# Patient Record
Sex: FEMALE | Race: BLACK OR AFRICAN AMERICAN | NOT HISPANIC OR LATINO | Employment: FULL TIME | URBAN - METROPOLITAN AREA
[De-identification: names, ages, dates, MRNs, and addresses within clinical notes are randomized per-mention and may not be internally consistent; named-entity substitution may affect disease eponyms.]

---

## 2023-02-25 LAB
ALANINE AMINOTRANSFERASE (SGPT) (U/L) IN SER/PLAS: 31 U/L (ref 7–45)
ALBUMIN (G/DL) IN SER/PLAS: 4.4 G/DL (ref 3.4–5)
ALKALINE PHOSPHATASE (U/L) IN SER/PLAS: 98 U/L (ref 33–110)
ANION GAP IN SER/PLAS: 11 MMOL/L (ref 10–20)
ASPARTATE AMINOTRANSFERASE (SGOT) (U/L) IN SER/PLAS: 31 U/L (ref 9–39)
BILIRUBIN TOTAL (MG/DL) IN SER/PLAS: 0.6 MG/DL (ref 0–1.2)
CALCIUM (MG/DL) IN SER/PLAS: 10 MG/DL (ref 8.6–10.6)
CARBON DIOXIDE, TOTAL (MMOL/L) IN SER/PLAS: 30 MMOL/L (ref 21–32)
CHLORIDE (MMOL/L) IN SER/PLAS: 103 MMOL/L (ref 98–107)
CHOLESTEROL (MG/DL) IN SER/PLAS: 241 MG/DL (ref 0–199)
CHOLESTEROL IN HDL (MG/DL) IN SER/PLAS: 87.9 MG/DL
CHOLESTEROL/HDL RATIO: 2.7
CREATININE (MG/DL) IN SER/PLAS: 1.02 MG/DL (ref 0.5–1.05)
GFR FEMALE: 67 ML/MIN/1.73M2
GLUCOSE (MG/DL) IN SER/PLAS: 90 MG/DL (ref 74–99)
LDL: 142 MG/DL (ref 0–99)
POTASSIUM (MMOL/L) IN SER/PLAS: 4.4 MMOL/L (ref 3.5–5.3)
PROTEIN TOTAL: 7.6 G/DL (ref 6.4–8.2)
SODIUM (MMOL/L) IN SER/PLAS: 140 MMOL/L (ref 136–145)
TRIGLYCERIDE (MG/DL) IN SER/PLAS: 58 MG/DL (ref 0–149)
UREA NITROGEN (MG/DL) IN SER/PLAS: 13 MG/DL (ref 6–23)
VLDL: 12 MG/DL (ref 0–40)

## 2023-09-23 ENCOUNTER — OFFICE VISIT (OUTPATIENT)
Dept: PRIMARY CARE | Facility: CLINIC | Age: 48
End: 2023-09-23
Payer: COMMERCIAL

## 2023-09-23 VITALS
DIASTOLIC BLOOD PRESSURE: 70 MMHG | WEIGHT: 293 LBS | HEIGHT: 71 IN | SYSTOLIC BLOOD PRESSURE: 132 MMHG | BODY MASS INDEX: 41.02 KG/M2

## 2023-09-23 DIAGNOSIS — E66.01 MORBID OBESITY (MULTI): ICD-10-CM

## 2023-09-23 DIAGNOSIS — M17.0 OSTEOARTHRITIS OF BOTH KNEES, UNSPECIFIED OSTEOARTHRITIS TYPE: Primary | ICD-10-CM

## 2023-09-23 DIAGNOSIS — E78.5 DYSLIPIDEMIA: ICD-10-CM

## 2023-09-23 PROCEDURE — 99214 OFFICE O/P EST MOD 30 MIN: CPT | Performed by: INTERNAL MEDICINE

## 2023-09-23 PROCEDURE — 1036F TOBACCO NON-USER: CPT | Performed by: INTERNAL MEDICINE

## 2023-09-23 RX ORDER — IBUPROFEN 800 MG/1
800 TABLET ORAL 3 TIMES DAILY PRN
Qty: 180 TABLET | Refills: 0 | Status: SHIPPED | OUTPATIENT
Start: 2023-09-23 | End: 2023-12-15

## 2023-09-23 RX ORDER — CYCLOBENZAPRINE HCL 10 MG
10 TABLET ORAL 3 TIMES DAILY
COMMUNITY
Start: 2023-04-12 | End: 2024-01-27 | Stop reason: ALTCHOICE

## 2023-09-23 RX ORDER — TIRZEPATIDE 2.5 MG/.5ML
2.5 INJECTION, SOLUTION SUBCUTANEOUS
Qty: 3 ML | Refills: 0 | Status: SHIPPED | OUTPATIENT
Start: 2023-09-23

## 2023-09-23 NOTE — PROGRESS NOTES
"Subjective   Patient ID: Taya Logan is a 48 y.o. female who presents for Follow-up and Med Refill.    Med Refill     Patient here for follow-up  due forblood work  Follow-up on high cholesterol  Lost few pounds  Having irregular menstrual cycle  Wants prescription for ibuprofen  Having difficult time losing weight  Had Achilles tendon and plantar fasciitis so difficult to do a lot of exercise        past recap  Patient here for follow-up on ultrasound of liver and ultrasound of parathyroid  Follow-up on elevated blood work for liver and parathyroid  She is changing the way she is eating and she has already lost 10 pounds in a month  She is thinking if she keeps losing this weight she does not want to go for gastric bypass  She is very motivated to make lifestyle changes  She wants refill on Motrin/omeprazole  Because of knee arthritis she is limited in her activity        Patient went for consultation for gastric bypass. Her blood work showed elevated PTH  She is going for sleep study on 10th  Had cardiac evaluation done normal     Patient is here for follow-up on blood work  She has started changing her attitude trying to eat better stay more active  She has made appointment with the bariatric surgeon because her orthopedic doctor said she cannot put more weight on her knee  She denies any risk for sleep apnea denies any sleepiness during the day sometimes she snores when she is tired  Sometimes feels dizzy     To establish PCP  For physical  Has bad arthritis in the knee needs Motrin prescription  Has struggled to lose weight     Past medical history: Gestational diabetes, arthroscopic surgery in both knees, pulmonary embolism after arthroscopic surgery on right knee     Social history: Non-smoker nondrinker     Review of Systems    Objective   /70   Ht 1.803 m (5' 11\")   Wt (!) 166 kg (367 lb)   BMI 51.19 kg/m²     Physical Exam  Vitals reviewed.   Constitutional:       Appearance: Normal appearance. "   HENT:      Head: Normocephalic and atraumatic.      Right Ear: Tympanic membrane, ear canal and external ear normal.      Left Ear: Tympanic membrane, ear canal and external ear normal.      Nose: Nose normal.      Mouth/Throat:      Pharynx: Oropharynx is clear.   Eyes:      Extraocular Movements: Extraocular movements intact.      Conjunctiva/sclera: Conjunctivae normal.      Pupils: Pupils are equal, round, and reactive to light.   Cardiovascular:      Rate and Rhythm: Normal rate and regular rhythm.      Pulses: Normal pulses.      Heart sounds: Normal heart sounds.   Pulmonary:      Effort: Pulmonary effort is normal.      Breath sounds: Normal breath sounds.   Abdominal:      General: Abdomen is flat. Bowel sounds are normal.      Palpations: Abdomen is soft.   Musculoskeletal:      Cervical back: Normal range of motion and neck supple.   Skin:     General: Skin is warm and dry.   Neurological:      General: No focal deficit present.      Mental Status: She is alert and oriented to person, place, and time.   Psychiatric:         Mood and Affect: Mood normal.         Assessment/Plan   Problem List Items Addressed This Visit             ICD-10-CM       Cardiac and Vasculature    Dyslipidemia E78.5       Endocrine/Metabolic    Morbid obesity (CMS/HCC) E66.01    Relevant Medications    tirzepatide (Mounjaro) 2.5 mg/0.5 mL pen injector       Musculoskeletal and Injuries    Osteoarthritis of both knees - Primary M17.0    Relevant Medications    ibuprofen 800 mg tablet     10/23  Cholesterol is reviewed  LDL has gone up slightly but HDL has improved quite a bit  Patient does not want to go on medication  Continue diet exercise  We will repeat blood work in 6 months  Her previous CT of the chest reviewed from 2013 which showed signs of pulmonary hypertension  We will get 2D echo  We will also do CT cardiac scoring for risk stratification  Again encouraged diet changes which will help her to lose weight     1/29  PTH  elevated  We will repeat PTH and ionized calcium and phosphorus  Ultrasound parathyroid  Patient does take calcium and vitamin D supplement  Liver enzyme is elevated as well which could be from fatty liver  We will check ultrasound liver and repeat blood work  Follow-up after the test     2/26  Ultrasound parathyroid normal  PTH level normal  Ultrasound liver normal  Liver blood test normal  Patient has lost 10 pounds  I think a lot of her abnormalities were related to her diet and lifestyle     5/7     Patient has lost more weight  She is still maintaining with diet and exercise  Blood work looks improved  Cholesterol is improving  We will recheck calcium and cholesterol in 6 months     2/25/23  Blood work ordered  Advised patient not to use too much ibuprofen it will hurt kidneys and cause for ulcer  Patient probably going through menopause  Ibuprofen prescribed  Again encouraged to lose weight or consider treatment options including surgery and medication     9/23/2023  Motrin prescription given  Blood work ordered  Cholesterol  Blood work ordered for cholesterol  Discussed diet and exercise  We can try Mounjaro for weight loss if covered by insurance

## 2023-09-29 PROBLEM — M17.0 OSTEOARTHRITIS OF BOTH KNEES: Status: ACTIVE | Noted: 2023-09-29

## 2023-09-29 PROBLEM — E66.01 MORBID OBESITY (MULTI): Status: ACTIVE | Noted: 2023-09-29

## 2023-09-29 PROBLEM — E78.5 DYSLIPIDEMIA: Status: ACTIVE | Noted: 2023-09-29

## 2023-10-07 PROBLEM — E66.9 OBESITY WITH BODY MASS INDEX 30 OR GREATER: Status: ACTIVE | Noted: 2023-10-07

## 2023-10-07 PROBLEM — L03.012 PARONYCHIA OF FINGER OF LEFT HAND: Status: ACTIVE | Noted: 2023-10-07

## 2023-10-07 PROBLEM — R79.89 ELEVATED PARATHYROID HORMONE: Status: ACTIVE | Noted: 2023-10-07

## 2023-10-07 PROBLEM — R20.2 PARESTHESIA: Status: ACTIVE | Noted: 2023-10-07

## 2023-10-07 PROBLEM — M17.0 PRIMARY OSTEOARTHRITIS OF BOTH KNEES: Status: ACTIVE | Noted: 2023-10-07

## 2023-10-07 PROBLEM — R79.89 ELEVATED LFTS: Status: ACTIVE | Noted: 2023-10-07

## 2023-10-07 PROBLEM — E66.01 SEVERE OBESITY (BMI >= 40) (MULTI): Status: ACTIVE | Noted: 2023-10-07

## 2023-10-07 PROBLEM — E78.5 HYPERLIPIDEMIA: Status: ACTIVE | Noted: 2023-10-07

## 2023-10-07 PROBLEM — I27.20 PULMONARY HYPERTENSION (MULTI): Status: ACTIVE | Noted: 2023-10-07

## 2023-10-07 PROBLEM — F06.8 OTHER SPECIFIED MENTAL DISORDERS DUE TO KNOWN PHYSIOLOGICAL CONDITION: Status: ACTIVE | Noted: 2023-10-07

## 2023-10-07 PROBLEM — Z98.84 BARIATRIC SURGERY STATUS: Status: ACTIVE | Noted: 2023-10-07

## 2023-10-07 PROBLEM — G47.30 SLEEP DISORDER BREATHING: Status: ACTIVE | Noted: 2023-10-07

## 2023-10-07 PROBLEM — R42 DIZZINESS: Status: ACTIVE | Noted: 2023-10-07

## 2023-10-07 RX ORDER — MELOXICAM 7.5 MG/1
7.5 TABLET ORAL 2 TIMES DAILY
COMMUNITY
Start: 2004-12-10 | End: 2024-01-27 | Stop reason: ALTCHOICE

## 2023-10-07 RX ORDER — CEPHALEXIN 500 MG/1
500 CAPSULE ORAL 3 TIMES DAILY
COMMUNITY
End: 2024-01-27 | Stop reason: ALTCHOICE

## 2023-10-09 ENCOUNTER — ANCILLARY PROCEDURE (OUTPATIENT)
Dept: RADIOLOGY | Facility: CLINIC | Age: 48
End: 2023-10-09
Payer: COMMERCIAL

## 2023-10-09 ENCOUNTER — OFFICE VISIT (OUTPATIENT)
Dept: ORTHOPEDIC SURGERY | Facility: CLINIC | Age: 48
End: 2023-10-09
Payer: COMMERCIAL

## 2023-10-09 ENCOUNTER — APPOINTMENT (OUTPATIENT)
Dept: ORTHOPEDIC SURGERY | Facility: CLINIC | Age: 48
End: 2023-10-09
Payer: COMMERCIAL

## 2023-10-09 DIAGNOSIS — M25.562 PAIN IN BOTH KNEES, UNSPECIFIED CHRONICITY: ICD-10-CM

## 2023-10-09 DIAGNOSIS — M17.0 PRIMARY OSTEOARTHRITIS OF BOTH KNEES: Primary | ICD-10-CM

## 2023-10-09 DIAGNOSIS — M77.8 TENDINITIS OF RIGHT SHOULDER: ICD-10-CM

## 2023-10-09 DIAGNOSIS — M25.511 ACUTE PAIN OF RIGHT SHOULDER: ICD-10-CM

## 2023-10-09 DIAGNOSIS — M25.561 PAIN IN BOTH KNEES, UNSPECIFIED CHRONICITY: ICD-10-CM

## 2023-10-09 PROCEDURE — 73030 X-RAY EXAM OF SHOULDER: CPT | Mod: RT,FY

## 2023-10-09 PROCEDURE — 73030 X-RAY EXAM OF SHOULDER: CPT | Mod: RIGHT SIDE | Performed by: STUDENT IN AN ORGANIZED HEALTH CARE EDUCATION/TRAINING PROGRAM

## 2023-10-09 PROCEDURE — 73564 X-RAY EXAM KNEE 4 OR MORE: CPT | Mod: 50

## 2023-10-09 PROCEDURE — 1036F TOBACCO NON-USER: CPT

## 2023-10-09 PROCEDURE — 73564 X-RAY EXAM KNEE 4 OR MORE: CPT | Mod: BILATERAL PROCEDURE | Performed by: STUDENT IN AN ORGANIZED HEALTH CARE EDUCATION/TRAINING PROGRAM

## 2023-10-09 PROCEDURE — 20610 DRAIN/INJ JOINT/BURSA W/O US: CPT

## 2023-10-09 PROCEDURE — 99214 OFFICE O/P EST MOD 30 MIN: CPT

## 2023-10-09 RX ORDER — LIDOCAINE HYDROCHLORIDE 10 MG/ML
4 INJECTION INFILTRATION; PERINEURAL
Status: COMPLETED | OUTPATIENT
Start: 2023-10-09 | End: 2023-10-09

## 2023-10-09 RX ORDER — MELOXICAM 15 MG/1
15 TABLET ORAL DAILY
Qty: 14 TABLET | Refills: 0 | Status: SHIPPED | OUTPATIENT
Start: 2023-10-09 | End: 2023-10-23

## 2023-10-09 RX ADMIN — LIDOCAINE HYDROCHLORIDE 4 ML: 10 INJECTION INFILTRATION; PERINEURAL at 10:53

## 2023-10-09 NOTE — PROGRESS NOTES
Taya Logan is a 48 y.o.  year-old  female presents with a chief complaint of Bilateral knee pain. Previously seen by Dr. Michael on 2/7/23 for her knees and has b/l steroid injections at that visit. She's these injections did help but the pain has progressively returned. Worse with activity. Better with injections and activity avoidance. They describe the symptoms as pain and swelling. Treatment to date has been ice/nsaids/activity modification without adequate relief. She is now having an acute exacerbation of underlying disease.     Patient is also having right shoulder pain. Ongoing for months now. Pain is in the front and outer aspect of the shoulder. Describes her pain has achy and sharp. Rates her pain at a 6/10. Admits to pain with rasing her arm over her head, pain with laying on her shoulder, night pain, and intermittent numbness and tingling down the arm into the last 2 fingers. Denies specific injury, weakness, neck pain, prior injury or surgeries to this shoulder. Has tried rest and home exercises with minimal relief of symptoms.       Past Medical, Family, and Social History reviewed and inlcude:[none] all other history pertinent to the presenting problem  Review of Systems  A complete review of systems was conducted, pertinent only to the HPI noted above.  Constitutional: None  Eyes: No additions to above history  Ears, Nose, Throat: No additions to above history  Cardiovascular: No additions to above history  Respiratory: No additions to above history  GI: No additions to above history  : No additions to above history  Skin/Neuro: No additions to above history  Endocrine/Heme/Lymph: No additions to above history  Immunologic: No additions to above history  Psychiatric: No additions to above history    Musculoskeletal: see above  GEN: Alert and Oriented x 3  Constitutional: Well appearing [male], in no apparent distress.  Eyes: sclera anicteric  ENT: hearing appropriate for normal conversation, neck  appears symmetric with no gross thyromegaly  Pulm: No labored breathing, no wheezing  CVS: Regular rate and rhythm  Skin: No rashes, erythema, or induration around knee    MUSCULOSKELETAL EXAM:     Bilateral KNEE:  ROM:  [0]/ [0] / 110  Effusion: [none]  Alignment: [neutral]      Sensation intact bilaterally sural/saphenous/sp/dp/tibial nerve bilaterally  Motor 5/5 knee flexion/extension/foot DF/PF/EHL/FHL bilaterally  Palpable/symmetric DP and PT pulse bilaterally      PATELLAR/EXTENSOR MECHANISM:  KNEE:  Patellar Crepitus: yes  Patellar Compression Pain:yes  Patellar Apprehension: [no]  Extensor Mechanism: [intact]  Straight Leg Raise: fair      LIGAMENTS:  ACL: Lachmans: [negative]  PCL: [stable]  Valgus at 0 degrees: [stable]  Valgus at 30 degrees: [stable]  Varus at 0 degrees: [stable]  Varus at 30 degrees: [stable]    MENISCUS EXAM:  Joint Line Tenderness:medial joint line tenderness  McMurrays: [negative]  Pain with Deep Flexion: [No]    Focused Musculoskeletal Exam:     Side: Right shoulder:  PROM:   FE (170)   ER 80 ABER/ABIR: 90/90  AROM:   FE (170)   ER 80 IR T8  Strength:  Supra [4+/5] Infra [5/5] Subscap [5/5]  Abd [5/5]    Special Tests  Shoulder  Impingement Tests:  Neer + Swift +  Speeds test +  Goldman's test +      ACJ:  AC TTP: [neg]  Cross Arm [neg]  AC prominence [no]      [Sensation intact Ax/median/ulnar/radial distributions  Motor intact Ax/median/radial/ulnar/AIN/PIN    X-rays of the shoulder ordered and independently viewed and interpreted:  Humeral head well aligned within glenoid. Joint spaces maintained. Small area of calcification noted in the distal supraspinatus tendon region. No fracture or dislocation.     Xrays of the b/l knees ordered and independently interpreted and reviewed:  Advanced DJD of the b/l knees, worse in right with significant osteophyte formation noted.     L Inj/Asp: bilateral knee on 10/9/2023 10:53 AM  Indications: pain  Details: 22 G needle, anterolateral  approach  Medications (Right): 40 mg triamcinolone acetonide 10 mg/mL; 4 mL lidocaine 10 mg/mL (1 %)  Medications (Left): 40 mg triamcinolone acetonide 10 mg/mL; 4 mL lidocaine 10 mg/mL (1 %)  Procedure, treatment alternatives, risks and benefits explained, specific risks discussed. Consent was given by the patient.          ASSESSMENT/PLAN  1. Primary osteoarthritis of both knees        2. Pain in both knees, unspecified chronicity  XR knee 4+ views bilateral      3. Acute pain of right shoulder  XR shoulder right 2+ views    Referral to Physical Therapy      4. Tendinitis of right shoulder           Knees:  The patient history, physical examination and imaging studies are consistent with knee arthritis. The treatment options including NSAIDs, activity modification, PT (including the importance of obtaining full motion), and weight loss were discussed at length today. I have also suggested a potential for b/l IA injection with cortisone and have provided today at her  request. I have discussed the potential need for arthroplasty in the future. The patient acknowledged the current plan.   Follow up will be 3 months if repeat injection indicated.    Right shoulder:  After a thorough discussion with the patient including expectations, I would recommend we continue a conservative program for now.  We discussed home/formal PT (deltoid isometrics, RTC strengthening, scapular stabilizers, stretching) and activity modification including avoidance of the positions and activities that provoke symptoms, including athletics.  We also discussed the ice and NSAIDS/tylenol. If they do not improve we'll get an MRI.      Social History     Socioeconomic History    Marital status:      Spouse name: Not on file    Number of children: Not on file    Years of education: Not on file    Highest education level: Not on file   Occupational History    Not on file   Tobacco Use    Smoking status: Never    Smokeless tobacco: Never    Substance and Sexual Activity    Alcohol use: Never    Drug use: Never    Sexual activity: Not on file   Other Topics Concern    Not on file   Social History Narrative    Not on file     Social Determinants of Health     Financial Resource Strain: Not on file   Food Insecurity: Not on file   Transportation Needs: Not on file   Physical Activity: Not on file   Stress: Not on file   Social Connections: Not on file   Intimate Partner Violence: Not on file   Housing Stability: Not on file      Active Ambulatory Problems     Diagnosis Date Noted    Dyslipidemia 09/29/2023    Osteoarthritis of both knees 09/29/2023    Morbid obesity (CMS/[facility]) 09/29/2023    Severe obesity (BMI >= 40) (CMS/[facility]) 10/07/2023    Bariatric surgery status 10/07/2023    Dizziness 10/07/2023    Elevated LFTs 10/07/2023    Elevated parathyroid hormone 10/07/2023    Hyperlipidemia 10/07/2023    Obesity with body mass index 30 or greater 10/07/2023    Other specified mental disorders due to known physiological condition 10/07/2023    Paresthesia 10/07/2023    Paronychia of finger of left hand 10/07/2023    Primary osteoarthritis of both knees 10/07/2023    Pulmonary hypertension (CMS/[facility]) 10/07/2023    Sleep disorder breathing 10/07/2023    Tendinitis of right shoulder 10/09/2023     Resolved Ambulatory Problems     Diagnosis Date Noted    No Resolved Ambulatory Problems     Past Medical History:   Diagnosis Date    Other pulmonary embolism without acute cor pulmonale (CMS/[facility]) 07/25/2017      Family History   Problem Relation Name Age of Onset    Hypertension Mother      Breast cancer Mother      Diabetes type II Mother      Hypertension Father      Lung cancer Father      Diabetes type II Father

## 2023-12-06 DIAGNOSIS — M17.0 OSTEOARTHRITIS OF BOTH KNEES, UNSPECIFIED OSTEOARTHRITIS TYPE: ICD-10-CM

## 2023-12-15 RX ORDER — IBUPROFEN 800 MG/1
800 TABLET ORAL 2 TIMES DAILY
Qty: 60 TABLET | Refills: 1 | Status: SHIPPED | OUTPATIENT
Start: 2023-12-15 | End: 2024-01-27 | Stop reason: SDUPTHER

## 2023-12-19 ENCOUNTER — ANCILLARY PROCEDURE (OUTPATIENT)
Dept: RADIOLOGY | Facility: CLINIC | Age: 48
End: 2023-12-19
Payer: COMMERCIAL

## 2023-12-19 ENCOUNTER — OFFICE VISIT (OUTPATIENT)
Dept: ORTHOPEDIC SURGERY | Facility: CLINIC | Age: 48
End: 2023-12-19
Payer: COMMERCIAL

## 2023-12-19 DIAGNOSIS — M25.552 LEFT HIP PAIN: ICD-10-CM

## 2023-12-19 DIAGNOSIS — M16.12 PRIMARY OSTEOARTHRITIS OF LEFT HIP: Primary | ICD-10-CM

## 2023-12-19 PROCEDURE — 1036F TOBACCO NON-USER: CPT | Performed by: STUDENT IN AN ORGANIZED HEALTH CARE EDUCATION/TRAINING PROGRAM

## 2023-12-19 PROCEDURE — 73502 X-RAY EXAM HIP UNI 2-3 VIEWS: CPT | Mod: LT

## 2023-12-19 PROCEDURE — 99213 OFFICE O/P EST LOW 20 MIN: CPT | Performed by: STUDENT IN AN ORGANIZED HEALTH CARE EDUCATION/TRAINING PROGRAM

## 2023-12-19 NOTE — PROGRESS NOTES
Taya Logan  is a 48 y.o. year-old  female. she presents to our office with left hip pain.  She has known knee osteoarthritis and had seen her for that in the past and provided corticosteroid injections.  Reports pain is deep in the groin as well as on the side and in the buttock.  There is been no injury.  She also discusses the shoulder pain that she has had since September she did see Elidia MESSINA earlier and prescribed a short course of anti-inflammatories that did not seem to help.  Is been no injury in the shoulder has pain reaching overhead but still has strength.     Past Medical, Family, and Social History reviewed and inlcude:[none] all other history pertinent to the presenting problem  Review of Systems  A complete review of systems was conducted, pertinent only to the HPI noted above.  Constitutional: None  Eyes: No additions to above history  Ears, Nose, Throat: No additions to above history  Cardiovascular: No additions to above history  Respiratory: No additions to above history  GI: No additions to above history  : No additions to above history  Skin/Neuro: No additions to above history  Endocrine/Heme/Lymph: No additions to above history  Immunologic: No additions to above history  Psychiatric: No additions to above history  Musculoskeletal: see above  Eyes: sclera anicteric  ENT: hearing appropriate for normal conversation, neck appears symmetric with no gross thyromegaly  Pulm: No labored breathing, no wheezing  CVS: Regular rate and rhythm  Abd: Non-distended  Skin: No rashes, erythema, or induration around hip    MUSCULOSKELETAL EXAM: HIP      Left  HIP:   IR@90: [20] degrees   ER@90: [70] degrees   Pain in groin with FADIR this reproduces her pain, + stinchfield, + subspine, no TTP over GT      Neurovascularly Intact to Femroal/obturator/LFCN/S/S/SPN/DPN/T nerves on bilateral extremities    Right shoulder was examined she has an active forward elevation of 160 degrees external rotation at the  side to 60 internal rotation to lower lumbar spine she has positive Neer and Swift cuff strength is 5 out of 5 but painful    Xrays independently reviewed and interpreted: There is some mild borderline dysplasia with a lateral center edge angle of 23 cam deformity mild degenerative changes  X-rays of the right shoulder were reviewed which demonstrate no degenerative changes and a small focus of calcium over her distal rotator cuff insertion    The patient history, physical examination and imaging studies are consistent with the diagnosis of femoroacetabular impingement (MARIANELA), borderline dysplasia and hip osteoarthritis.  Also reviewed her shoulder which likely has some rotator cuff tendinitis we will prescribe some home exercises that she can work on.  Can always consider cortisone shot in the future if required.    Options were discussed with the patient today, including continued conservative management vs. potential surgical intervention.  I reviewed given her degenerative changes and obesity she would not be a candidate for hip arthroscopy.  I suggested a conservative course including consideration for corticosteroid injection and I have referred her to a partner for that.  Did review overall the need to consider weight loss to improve symptoms and all of her joints, which she is actively working on but is very difficult with all the pain she is having in her joints.    After a thorough discussion with the patient including expectations, I would recommend we continue a conservative program for now.  We discussed activity modification, over the counter medications including NSAIDs, and PT.    I would like to proceed with a further work up/treatment/paperwork including the following:  intra-articular injection

## 2023-12-22 ENCOUNTER — APPOINTMENT (OUTPATIENT)
Dept: PRIMARY CARE | Facility: CLINIC | Age: 48
End: 2023-12-22
Payer: COMMERCIAL

## 2023-12-30 ENCOUNTER — APPOINTMENT (OUTPATIENT)
Dept: PRIMARY CARE | Facility: CLINIC | Age: 48
End: 2023-12-30
Payer: COMMERCIAL

## 2024-01-02 ENCOUNTER — OFFICE VISIT (OUTPATIENT)
Dept: ORTHOPEDIC SURGERY | Facility: CLINIC | Age: 49
End: 2024-01-02
Payer: COMMERCIAL

## 2024-01-02 DIAGNOSIS — M16.12 PRIMARY OSTEOARTHRITIS OF LEFT HIP: ICD-10-CM

## 2024-01-02 PROCEDURE — 1036F TOBACCO NON-USER: CPT | Performed by: FAMILY MEDICINE

## 2024-01-02 PROCEDURE — 99203 OFFICE O/P NEW LOW 30 MIN: CPT | Performed by: FAMILY MEDICINE

## 2024-01-02 PROCEDURE — 20611 DRAIN/INJ JOINT/BURSA W/US: CPT | Performed by: FAMILY MEDICINE

## 2024-01-02 RX ORDER — TRIAMCINOLONE ACETONIDE 40 MG/ML
80 INJECTION, SUSPENSION INTRA-ARTICULAR; INTRAMUSCULAR
Status: COMPLETED | OUTPATIENT
Start: 2024-01-02 | End: 2024-01-02

## 2024-01-02 RX ORDER — LIDOCAINE HYDROCHLORIDE 10 MG/ML
4 INJECTION INFILTRATION; PERINEURAL
Status: COMPLETED | OUTPATIENT
Start: 2024-01-02 | End: 2024-01-02

## 2024-01-02 RX ADMIN — LIDOCAINE HYDROCHLORIDE 4 ML: 10 INJECTION INFILTRATION; PERINEURAL at 16:23

## 2024-01-02 RX ADMIN — TRIAMCINOLONE ACETONIDE 80 MG: 40 INJECTION, SUSPENSION INTRA-ARTICULAR; INTRAMUSCULAR at 16:23

## 2024-01-02 NOTE — PROGRESS NOTES
** Please excuse any errors in grammar or translation related to this dictation. Voice recognition software was utilized to prepare this document. **    Assessment & Plan:  Patient referred for left hip steroid injection. After reviewing patient's medical history as well as examining patient, agree that a steroid injection may be beneficial for management of the patient's arthritic condition. Patient was given option to have this injection completed today which they agreed to have done. See below. Discussed with patient that this injection can be repeated every 3 or more months as dictated by symptoms.  Return precautions given. All questions answered and patient is agreeable this plan of care.    A copy of today's report will be electronically sent to  Dr. Michael.       Chief complaint:  Left hip pain    HPI:  49 y/o patient, hx of knee OA, presents with left hip pain.  This complaint has been ongoing for 8-9 months.  She was using a knee scooter after ankle surgery and had a few falls but is unsure if that is what caused her pain.  Symptoms have progressively worsened with time.  Pain is most prominent at buttock, lateral hip and groin.   Symptoms are aggravated by laying on side, working out, walking for extended periods. Previously saw Dr. Michael for this with last visit being 12/19/23. She was referred by Dr. Michael to be considered for US-guided hip joint injection.  Denies previous surgery to this site.     Exam:  LEFT Hip Exam:  No warmth, erythema or ecchymosis overlying.  Active flexion >90 degrees with grossly normal abduction, adduction  NTTP over greater trochanter  5/5 strength of hip flexion, abduction, & adduction  SILT  - Log roll pain, + FADIR pain,- BELKIS pain, + Stinchfield      Results:  X-rays of left hip obtained 12/19/2023 reviewed and independent interpreted as mild arthritic changes.  No fracture.  Normal alignment.  Cam deformity present.    Reviewed referral note from Dr. Michael dated  12/19/2023.    Procedure:  Patient ID: Taya Logan is a 48 y.o. female.    L Inj/Asp: L hip joint on 1/2/2024 4:23 PM  Indications: pain  Details: 22 G needle, ultrasound-guided anterior approach  Medications: 80 mg triamcinolone acetonide 40 mg/mL; 4 mL lidocaine 10 mg/mL (1 %)  Outcome: tolerated well, no immediate complications  Procedure, treatment alternatives, risks and benefits explained, specific risks discussed. Consent was given by the patient. Immediately prior to procedure a time out was called to verify the correct patient, procedure, equipment, support staff and site/side marked as required. Patient was prepped and draped in the usual sterile fashion.

## 2024-01-02 NOTE — LETTER
January 2, 2024     Rob Michael MD  [id number]  [place] [address]    Patient: Taya Logan   YOB: 1975   Date of Visit: 1/2/2024       Dear Dr. Rob Michael MD:    Thank you for referring Taya Logan to me for evaluation. Below are my notes for this consultation.  If you have questions, please do not hesitate to call me. I look forward to following your patient along with you.       Sincerely,     Dante Solorio, DO      CC: No Recipients  ______________________________________________________________________________________    ** Please excuse any errors in grammar or translation related to this dictation. Voice recognition software was utilized to prepare this document. **    Assessment & Plan:  Patient referred for left hip steroid injection. After reviewing patient's medical history as well as examining patient, agree that a steroid injection may be beneficial for management of the patient's arthritic condition. Patient was given option to have this injection completed today which they agreed to have done. See below. Discussed with patient that this injection can be repeated every 3 or more months as dictated by symptoms.  Return precautions given. All questions answered and patient is agreeable this plan of care.    A copy of today's report will be electronically sent to  Dr. Michael.       Chief complaint:  Left hip pain    HPI:  49 y/o patient, hx of knee OA, presents with left hip pain.  This complaint has been ongoing for 8-9 months.  She was using a knee scooter after ankle surgery and had a few falls but is unsure if that is what caused her pain.  Symptoms have progressively worsened with time.  Pain is most prominent at buttock, lateral hip and groin.   Symptoms are aggravated by laying on side, working out, walking for extended periods. Previously saw Dr. Michael for this with last visit being 12/19/23. She was referred by Dr. Michael to be considered for US-guided hip joint  injection.  Denies previous surgery to this site.     Exam:  LEFT Hip Exam:  No warmth, erythema or ecchymosis overlying.  Active flexion >90 degrees with grossly normal abduction, adduction  NTTP over greater trochanter  5/5 strength of hip flexion, abduction, & adduction  SILT  - Log roll pain, + FADIR pain,- BELKIS pain, + Stinchfield      Results:  X-rays of left hip obtained 12/19/2023 reviewed and independent interpreted as mild arthritic changes.  No fracture.  Normal alignment.  Cam deformity present.    Reviewed referral note from Dr. Michael dated 12/19/2023.    Procedure:  Patient ID: Taya Logan is a 48 y.o. female.    L Inj/Asp: L hip joint on 1/2/2024 4:23 PM  Indications: pain  Details: 22 G needle, ultrasound-guided anterior approach  Medications: 80 mg triamcinolone acetonide 40 mg/mL; 4 mL lidocaine 10 mg/mL (1 %)  Outcome: tolerated well, no immediate complications  Procedure, treatment alternatives, risks and benefits explained, specific risks discussed. Consent was given by the patient. Immediately prior to procedure a time out was called to verify the correct patient, procedure, equipment, support staff and site/side marked as required. Patient was prepped and draped in the usual sterile fashion.

## 2024-01-27 ENCOUNTER — LAB (OUTPATIENT)
Dept: LAB | Facility: LAB | Age: 49
End: 2024-01-27
Payer: COMMERCIAL

## 2024-01-27 ENCOUNTER — OFFICE VISIT (OUTPATIENT)
Dept: PRIMARY CARE | Facility: CLINIC | Age: 49
End: 2024-01-27
Payer: COMMERCIAL

## 2024-01-27 VITALS — WEIGHT: 293 LBS | BODY MASS INDEX: 41.02 KG/M2 | HEIGHT: 71 IN

## 2024-01-27 DIAGNOSIS — M17.0 OSTEOARTHRITIS OF BOTH KNEES, UNSPECIFIED OSTEOARTHRITIS TYPE: ICD-10-CM

## 2024-01-27 DIAGNOSIS — R73.9 ELEVATED BLOOD SUGAR: ICD-10-CM

## 2024-01-27 DIAGNOSIS — E78.5 DYSLIPIDEMIA: ICD-10-CM

## 2024-01-27 DIAGNOSIS — R79.89 ELEVATED LFTS: ICD-10-CM

## 2024-01-27 LAB
ALBUMIN SERPL BCP-MCNC: 4.5 G/DL (ref 3.4–5)
ALP SERPL-CCNC: 99 U/L (ref 33–110)
ALT SERPL W P-5'-P-CCNC: 27 U/L (ref 7–45)
ANION GAP SERPL CALC-SCNC: 12 MMOL/L (ref 10–20)
AST SERPL W P-5'-P-CCNC: 27 U/L (ref 9–39)
BILIRUB SERPL-MCNC: 0.4 MG/DL (ref 0–1.2)
BUN SERPL-MCNC: 12 MG/DL (ref 6–23)
CALCIUM SERPL-MCNC: 10.3 MG/DL (ref 8.6–10.6)
CHLORIDE SERPL-SCNC: 103 MMOL/L (ref 98–107)
CHOLEST SERPL-MCNC: 208 MG/DL (ref 0–199)
CHOLESTEROL/HDL RATIO: 2.5
CO2 SERPL-SCNC: 30 MMOL/L (ref 21–32)
CREAT SERPL-MCNC: 0.96 MG/DL (ref 0.5–1.05)
EGFRCR SERPLBLD CKD-EPI 2021: 72 ML/MIN/1.73M*2
ERYTHROCYTE [DISTWIDTH] IN BLOOD BY AUTOMATED COUNT: 14.2 % (ref 11.5–14.5)
EST. AVERAGE GLUCOSE BLD GHB EST-MCNC: 103 MG/DL
GLUCOSE SERPL-MCNC: 94 MG/DL (ref 74–99)
HBA1C MFR BLD: 5.2 %
HCT VFR BLD AUTO: 42.2 % (ref 36–46)
HDLC SERPL-MCNC: 83.8 MG/DL
HGB BLD-MCNC: 13.8 G/DL (ref 12–16)
LDLC SERPL CALC-MCNC: 111 MG/DL
MCH RBC QN AUTO: 28.5 PG (ref 26–34)
MCHC RBC AUTO-ENTMCNC: 32.7 G/DL (ref 32–36)
MCV RBC AUTO: 87 FL (ref 80–100)
NON HDL CHOLESTEROL: 124 MG/DL (ref 0–149)
NRBC BLD-RTO: 0 /100 WBCS (ref 0–0)
PLATELET # BLD AUTO: 337 X10*3/UL (ref 150–450)
POTASSIUM SERPL-SCNC: 4.4 MMOL/L (ref 3.5–5.3)
PROT SERPL-MCNC: 8.1 G/DL (ref 6.4–8.2)
RBC # BLD AUTO: 4.85 X10*6/UL (ref 4–5.2)
SODIUM SERPL-SCNC: 141 MMOL/L (ref 136–145)
TRIGL SERPL-MCNC: 65 MG/DL (ref 0–149)
TSH SERPL-ACNC: 1.58 MIU/L (ref 0.44–3.98)
VLDL: 13 MG/DL (ref 0–40)
WBC # BLD AUTO: 6.1 X10*3/UL (ref 4.4–11.3)

## 2024-01-27 PROCEDURE — 85027 COMPLETE CBC AUTOMATED: CPT

## 2024-01-27 PROCEDURE — 36415 COLL VENOUS BLD VENIPUNCTURE: CPT

## 2024-01-27 PROCEDURE — 84443 ASSAY THYROID STIM HORMONE: CPT

## 2024-01-27 PROCEDURE — 80053 COMPREHEN METABOLIC PANEL: CPT

## 2024-01-27 PROCEDURE — 1036F TOBACCO NON-USER: CPT | Performed by: INTERNAL MEDICINE

## 2024-01-27 PROCEDURE — 83036 HEMOGLOBIN GLYCOSYLATED A1C: CPT

## 2024-01-27 PROCEDURE — 99213 OFFICE O/P EST LOW 20 MIN: CPT | Performed by: INTERNAL MEDICINE

## 2024-01-27 PROCEDURE — 80061 LIPID PANEL: CPT

## 2024-01-27 RX ORDER — IBUPROFEN 800 MG/1
800 TABLET ORAL 2 TIMES DAILY
Qty: 60 TABLET | Refills: 1 | Status: SHIPPED | OUTPATIENT
Start: 2024-01-27

## 2024-01-29 NOTE — PROGRESS NOTES
"Subjective   Patient ID: Taya Logan is a 48 y.o. female who presents for Follow-up and Med Refill.    Med Refill     Patient here for follow-up  due forblood work  Follow-up on high cholesterol  Lost few pounds  Having irregular menstrual cycle  Wants prescription for ibuprofen  Working on losing weight has lost 10 pounds since January beginning        past recap  Patient here for follow-up on ultrasound of liver and ultrasound of parathyroid  Follow-up on elevated blood work for liver and parathyroid  She is changing the way she is eating and she has already lost 10 pounds in a month  She is thinking if she keeps losing this weight she does not want to go for gastric bypass  She is very motivated to make lifestyle changes  She wants refill on Motrin/omeprazole  Because of knee arthritis she is limited in her activity        Patient went for consultation for gastric bypass. Her blood work showed elevated PTH  She is going for sleep study on 10th  Had cardiac evaluation done normal     Patient is here for follow-up on blood work  She has started changing her attitude trying to eat better stay more active  She has made appointment with the bariatric surgeon because her orthopedic doctor said she cannot put more weight on her knee  She denies any risk for sleep apnea denies any sleepiness during the day sometimes she snores when she is tired  Sometimes feels dizzy     To establish PCP  For physical  Has bad arthritis in the knee needs Motrin prescription  Has struggled to lose weight     Past medical history: Gestational diabetes, arthroscopic surgery in both knees, pulmonary embolism after arthroscopic surgery on right knee     Social history: Non-smoker nondrinker     Review of Systems    Objective   Ht 1.803 m (5' 11\")   Wt (!) 163 kg (360 lb)   BMI 50.21 kg/m²     Physical Exam  Vitals reviewed.   Constitutional:       Appearance: Normal appearance.   HENT:      Head: Normocephalic and atraumatic.      Right " Ear: Tympanic membrane, ear canal and external ear normal.      Left Ear: Tympanic membrane, ear canal and external ear normal.      Nose: Nose normal.      Mouth/Throat:      Pharynx: Oropharynx is clear.   Eyes:      Extraocular Movements: Extraocular movements intact.      Conjunctiva/sclera: Conjunctivae normal.      Pupils: Pupils are equal, round, and reactive to light.   Cardiovascular:      Rate and Rhythm: Normal rate and regular rhythm.      Pulses: Normal pulses.      Heart sounds: Normal heart sounds.   Pulmonary:      Effort: Pulmonary effort is normal.      Breath sounds: Normal breath sounds.   Abdominal:      General: Abdomen is flat. Bowel sounds are normal.      Palpations: Abdomen is soft.   Musculoskeletal:      Cervical back: Normal range of motion and neck supple.   Skin:     General: Skin is warm and dry.   Neurological:      General: No focal deficit present.      Mental Status: She is alert and oriented to person, place, and time.   Psychiatric:         Mood and Affect: Mood normal.         Assessment/Plan   Problem List Items Addressed This Visit             ICD-10-CM       Cardiac and Vasculature    Dyslipidemia E78.5    Relevant Orders    CBC (Completed)    Comprehensive Metabolic Panel (Completed)    Lipid Panel (Completed)    Thyroid Stimulating Hormone (Completed)    Hemoglobin A1C (Completed)       Gastrointestinal and Abdominal    Elevated LFTs R79.89    Relevant Orders    CBC (Completed)    Comprehensive Metabolic Panel (Completed)    Lipid Panel (Completed)    Thyroid Stimulating Hormone (Completed)    Hemoglobin A1C (Completed)       Musculoskeletal and Injuries    Osteoarthritis of both knees M17.0    Relevant Medications    ibuprofen 800 mg tablet    Other Relevant Orders    Hemoglobin A1C (Completed)     Other Visit Diagnoses         Codes    Elevated blood sugar     R73.9    Relevant Orders    Hemoglobin A1C (Completed)          10/23  Cholesterol is reviewed  LDL has gone up  slightly but HDL has improved quite a bit  Patient does not want to go on medication  Continue diet exercise  We will repeat blood work in 6 months  Her previous CT of the chest reviewed from 2013 which showed signs of pulmonary hypertension  We will get 2D echo  We will also do CT cardiac scoring for risk stratification  Again encouraged diet changes which will help her to lose weight     1/29  PTH elevated  We will repeat PTH and ionized calcium and phosphorus  Ultrasound parathyroid  Patient does take calcium and vitamin D supplement  Liver enzyme is elevated as well which could be from fatty liver  We will check ultrasound liver and repeat blood work  Follow-up after the test     2/26  Ultrasound parathyroid normal  PTH level normal  Ultrasound liver normal  Liver blood test normal  Patient has lost 10 pounds  I think a lot of her abnormalities were related to her diet and lifestyle     5/7     Patient has lost more weight  She is still maintaining with diet and exercise  Blood work looks improved  Cholesterol is improving  We will recheck calcium and cholesterol in 6 months     2/25/23  Blood work ordered  Advised patient not to use too much ibuprofen it will hurt kidneys and cause for ulcer  Patient probably going through menopause  Ibuprofen prescribed  Again encouraged to lose weight or consider treatment options including surgery and medication     9/23/2023  Motrin prescription given  Blood work ordered  Cholesterol  Blood work ordered for cholesterol  Discussed diet and exercise  We can try Mounjaro for weight loss if covered by insurance    1/26/2024  Blood work reviewed  Everything is looking good except cholesterol is little high  Patient is eating a lot of meat  She is cannot take of her diet  Blood pressure is stable  Refills given on Motrin  Appreciated for losing weight  Continue diet and exercise  Patient is refusing weight loss shots  Follow-up in 6 months

## 2024-02-19 ENCOUNTER — APPOINTMENT (OUTPATIENT)
Dept: RADIOLOGY | Facility: CLINIC | Age: 49
End: 2024-02-19
Payer: COMMERCIAL

## 2024-03-08 ENCOUNTER — HOSPITAL ENCOUNTER (OUTPATIENT)
Dept: RADIOLOGY | Facility: CLINIC | Age: 49
Discharge: HOME | End: 2024-03-08
Payer: COMMERCIAL

## 2024-03-08 VITALS — WEIGHT: 293 LBS | HEIGHT: 72 IN | BODY MASS INDEX: 39.68 KG/M2

## 2024-03-08 DIAGNOSIS — Z12.31 ENCOUNTER FOR SCREENING MAMMOGRAM FOR MALIGNANT NEOPLASM OF BREAST: ICD-10-CM

## 2024-03-08 PROCEDURE — 77063 BREAST TOMOSYNTHESIS BI: CPT | Performed by: RADIOLOGY

## 2024-03-08 PROCEDURE — 77067 SCR MAMMO BI INCL CAD: CPT | Performed by: RADIOLOGY

## 2024-03-08 PROCEDURE — 77067 SCR MAMMO BI INCL CAD: CPT

## 2024-03-25 ENCOUNTER — APPOINTMENT (OUTPATIENT)
Dept: OBSTETRICS AND GYNECOLOGY | Facility: CLINIC | Age: 49
End: 2024-03-25
Payer: COMMERCIAL

## 2024-04-16 ENCOUNTER — LAB (OUTPATIENT)
Dept: LAB | Facility: LAB | Age: 49
End: 2024-04-16
Payer: COMMERCIAL

## 2024-04-16 DIAGNOSIS — H15.101 UNSPECIFIED EPISCLERITIS, RIGHT EYE: Primary | ICD-10-CM

## 2024-04-16 PROCEDURE — 82164 ANGIOTENSIN I ENZYME TEST: CPT

## 2024-04-16 PROCEDURE — 83516 IMMUNOASSAY NONANTIBODY: CPT

## 2024-04-16 PROCEDURE — 87389 HIV-1 AG W/HIV-1&-2 AB AG IA: CPT

## 2024-04-16 PROCEDURE — 36415 COLL VENOUS BLD VENIPUNCTURE: CPT

## 2024-04-16 PROCEDURE — 86036 ANCA SCREEN EACH ANTIBODY: CPT

## 2024-04-16 PROCEDURE — 85025 COMPLETE CBC W/AUTO DIFF WBC: CPT

## 2024-04-16 PROCEDURE — 86038 ANTINUCLEAR ANTIBODIES: CPT

## 2024-04-16 PROCEDURE — 86481 TB AG RESPONSE T-CELL SUSP: CPT

## 2024-04-16 PROCEDURE — 80053 COMPREHEN METABOLIC PANEL: CPT

## 2024-04-16 PROCEDURE — 85549 MURAMIDASE: CPT

## 2024-04-16 PROCEDURE — 86780 TREPONEMA PALLIDUM: CPT

## 2024-04-17 LAB
ALBUMIN SERPL BCP-MCNC: 4.1 G/DL (ref 3.4–5)
ALP SERPL-CCNC: 67 U/L (ref 33–110)
ALT SERPL W P-5'-P-CCNC: 11 U/L (ref 7–45)
ANA SER QL HEP2 SUBST: NEGATIVE
ANION GAP SERPL CALC-SCNC: 11 MMOL/L (ref 10–20)
AST SERPL W P-5'-P-CCNC: 18 U/L (ref 9–39)
BASOPHILS # BLD AUTO: 0.07 X10*3/UL (ref 0–0.1)
BASOPHILS NFR BLD AUTO: 1 %
BILIRUB SERPL-MCNC: 0.3 MG/DL (ref 0–1.2)
BUN SERPL-MCNC: 10 MG/DL (ref 6–23)
CALCIUM SERPL-MCNC: 9.9 MG/DL (ref 8.6–10.6)
CHLORIDE SERPL-SCNC: 103 MMOL/L (ref 98–107)
CO2 SERPL-SCNC: 28 MMOL/L (ref 21–32)
CREAT SERPL-MCNC: 0.91 MG/DL (ref 0.5–1.05)
EGFRCR SERPLBLD CKD-EPI 2021: 77 ML/MIN/1.73M*2
EOSINOPHIL # BLD AUTO: 0.13 X10*3/UL (ref 0–0.7)
EOSINOPHIL NFR BLD AUTO: 1.8 %
ERYTHROCYTE [DISTWIDTH] IN BLOOD BY AUTOMATED COUNT: 14.4 % (ref 11.5–14.5)
GLUCOSE SERPL-MCNC: 97 MG/DL (ref 74–99)
HCT VFR BLD AUTO: 40.9 % (ref 36–46)
HGB BLD-MCNC: 12.7 G/DL (ref 12–16)
HIV 1+2 AB+HIV1 P24 AG SERPL QL IA: NONREACTIVE
IMM GRANULOCYTES # BLD AUTO: 0.04 X10*3/UL (ref 0–0.7)
IMM GRANULOCYTES NFR BLD AUTO: 0.5 % (ref 0–0.9)
LYMPHOCYTES # BLD AUTO: 3.12 X10*3/UL (ref 1.2–4.8)
LYMPHOCYTES NFR BLD AUTO: 42.5 %
MCH RBC QN AUTO: 26.9 PG (ref 26–34)
MCHC RBC AUTO-ENTMCNC: 31.1 G/DL (ref 32–36)
MCV RBC AUTO: 87 FL (ref 80–100)
MONOCYTES # BLD AUTO: 0.44 X10*3/UL (ref 0.1–1)
MONOCYTES NFR BLD AUTO: 6 %
NEUTROPHILS # BLD AUTO: 3.54 X10*3/UL (ref 1.2–7.7)
NEUTROPHILS NFR BLD AUTO: 48.2 %
NRBC BLD-RTO: 0 /100 WBCS (ref 0–0)
PLATELET # BLD AUTO: 376 X10*3/UL (ref 150–450)
POTASSIUM SERPL-SCNC: 4.4 MMOL/L (ref 3.5–5.3)
PROT SERPL-MCNC: 6.8 G/DL (ref 6.4–8.2)
RBC # BLD AUTO: 4.72 X10*6/UL (ref 4–5.2)
SODIUM SERPL-SCNC: 138 MMOL/L (ref 136–145)
TREPONEMA PALLIDUM IGG+IGM AB [PRESENCE] IN SERUM OR PLASMA BY IMMUNOASSAY: NONREACTIVE
WBC # BLD AUTO: 7.3 X10*3/UL (ref 4.4–11.3)

## 2024-04-18 LAB — ACE SERPL-CCNC: 26 U/L (ref 16–85)

## 2024-04-19 LAB
ANCA AB PATTERN SER IF-IMP: NORMAL
ANCA IGG TITR SER IF: NORMAL {TITER}
MYELOPEROXIDASE AB SER-ACNC: 0 AU/ML (ref 0–19)
NIL(NEG) CONTROL SPOT COUNT: NORMAL
PANEL A SPOT COUNT: 0
PANEL B SPOT COUNT: 0
POS CONTROL SPOT COUNT: NORMAL
PROTEINASE3 AB SER-ACNC: 0 AU/ML (ref 0–19)
T-SPOT. TB INTERPRETATION: NEGATIVE

## 2024-04-20 LAB
ANCA IFA PATTERN: NORMAL
ANCA IFA TITER: NORMAL

## 2024-04-22 LAB — LYSOZYME SERPL-MCNC: 1.29 UG/ML

## 2024-07-08 ENCOUNTER — APPOINTMENT (OUTPATIENT)
Dept: OBSTETRICS AND GYNECOLOGY | Facility: CLINIC | Age: 49
End: 2024-07-08
Payer: COMMERCIAL

## 2024-08-10 ENCOUNTER — OFFICE VISIT (OUTPATIENT)
Dept: PRIMARY CARE | Facility: CLINIC | Age: 49
End: 2024-08-10
Payer: COMMERCIAL

## 2024-08-10 ENCOUNTER — LAB (OUTPATIENT)
Dept: LAB | Facility: LAB | Age: 49
End: 2024-08-10
Payer: COMMERCIAL

## 2024-08-10 VITALS
SYSTOLIC BLOOD PRESSURE: 128 MMHG | BODY MASS INDEX: 39.68 KG/M2 | WEIGHT: 293 LBS | DIASTOLIC BLOOD PRESSURE: 78 MMHG | HEIGHT: 72 IN

## 2024-08-10 DIAGNOSIS — M17.0 OSTEOARTHRITIS OF BOTH KNEES, UNSPECIFIED OSTEOARTHRITIS TYPE: ICD-10-CM

## 2024-08-10 DIAGNOSIS — E78.5 HYPERLIPIDEMIA, UNSPECIFIED HYPERLIPIDEMIA TYPE: ICD-10-CM

## 2024-08-10 DIAGNOSIS — Z71.89 ENCOUNTER FOR COUNSELING FOR SLEEP APNEA: Primary | ICD-10-CM

## 2024-08-10 DIAGNOSIS — G47.30 ENCOUNTER FOR COUNSELING FOR SLEEP APNEA: Primary | ICD-10-CM

## 2024-08-10 LAB
ALBUMIN SERPL BCP-MCNC: 4 G/DL (ref 3.4–5)
ALP SERPL-CCNC: 81 U/L (ref 33–110)
ALT SERPL W P-5'-P-CCNC: 13 U/L (ref 7–45)
ANION GAP SERPL CALC-SCNC: 12 MMOL/L (ref 10–20)
AST SERPL W P-5'-P-CCNC: 19 U/L (ref 9–39)
BILIRUB SERPL-MCNC: 0.4 MG/DL (ref 0–1.2)
BUN SERPL-MCNC: 14 MG/DL (ref 6–23)
CALCIUM SERPL-MCNC: 9.6 MG/DL (ref 8.6–10.6)
CHLORIDE SERPL-SCNC: 103 MMOL/L (ref 98–107)
CHOLEST SERPL-MCNC: 232 MG/DL (ref 0–199)
CHOLESTEROL/HDL RATIO: 2.8
CO2 SERPL-SCNC: 28 MMOL/L (ref 21–32)
CREAT SERPL-MCNC: 1.01 MG/DL (ref 0.5–1.05)
EGFRCR SERPLBLD CKD-EPI 2021: 68 ML/MIN/1.73M*2
ERYTHROCYTE [DISTWIDTH] IN BLOOD BY AUTOMATED COUNT: 13.7 % (ref 11.5–14.5)
GLUCOSE SERPL-MCNC: 87 MG/DL (ref 74–99)
HCT VFR BLD AUTO: 41.8 % (ref 36–46)
HDLC SERPL-MCNC: 82.4 MG/DL
HGB BLD-MCNC: 13.2 G/DL (ref 12–16)
LDLC SERPL CALC-MCNC: 134 MG/DL
MCH RBC QN AUTO: 27.4 PG (ref 26–34)
MCHC RBC AUTO-ENTMCNC: 31.6 G/DL (ref 32–36)
MCV RBC AUTO: 87 FL (ref 80–100)
NON HDL CHOLESTEROL: 150 MG/DL (ref 0–149)
NRBC BLD-RTO: 0 /100 WBCS (ref 0–0)
PLATELET # BLD AUTO: 369 X10*3/UL (ref 150–450)
POTASSIUM SERPL-SCNC: 5.2 MMOL/L (ref 3.5–5.3)
PROT SERPL-MCNC: 6.9 G/DL (ref 6.4–8.2)
RBC # BLD AUTO: 4.81 X10*6/UL (ref 4–5.2)
SODIUM SERPL-SCNC: 138 MMOL/L (ref 136–145)
TRIGL SERPL-MCNC: 76 MG/DL (ref 0–149)
VLDL: 15 MG/DL (ref 0–40)
WBC # BLD AUTO: 6.3 X10*3/UL (ref 4.4–11.3)

## 2024-08-10 PROCEDURE — 99214 OFFICE O/P EST MOD 30 MIN: CPT | Performed by: INTERNAL MEDICINE

## 2024-08-10 PROCEDURE — 36415 COLL VENOUS BLD VENIPUNCTURE: CPT

## 2024-08-10 PROCEDURE — 80053 COMPREHEN METABOLIC PANEL: CPT

## 2024-08-10 PROCEDURE — 80061 LIPID PANEL: CPT

## 2024-08-10 PROCEDURE — 3008F BODY MASS INDEX DOCD: CPT | Performed by: INTERNAL MEDICINE

## 2024-08-10 PROCEDURE — 85027 COMPLETE CBC AUTOMATED: CPT

## 2024-08-10 RX ORDER — IBUPROFEN 800 MG/1
800 TABLET ORAL 2 TIMES DAILY
Qty: 180 TABLET | Refills: 1 | Status: CANCELLED | OUTPATIENT
Start: 2024-08-10

## 2024-08-10 ASSESSMENT — ENCOUNTER SYMPTOMS
LOSS OF SENSATION IN FEET: 0
DEPRESSION: 0
OCCASIONAL FEELINGS OF UNSTEADINESS: 0

## 2024-08-10 NOTE — PROGRESS NOTES
Subjective   Patient ID: Taya Logan is a 49 y.o. female who presents for Follow-up.    Med Refill    Patient is here for follow-up.  She is struggling with her right eye pressures being high up-and-down since January  Wants Motrin for joint pains  Having difficult time losing weight as she is not able to exercise at all.  She has cut down her calorie intake  Does not sleep well does not feel rested after the sleep and does not snore     patient here for follow-up  due forblood work  Follow-up on high cholesterol  Lost few pounds  Having irregular menstrual cycle  Wants prescription for ibuprofen  Working on losing weight has lost 10 pounds since January beginning        past recap  Patient here for follow-up on ultrasound of liver and ultrasound of parathyroid  Follow-up on elevated blood work for liver and parathyroid  She is changing the way she is eating and she has already lost 10 pounds in a month  She is thinking if she keeps losing this weight she does not want to go for gastric bypass  She is very motivated to make lifestyle changes  She wants refill on Motrin/omeprazole  Because of knee arthritis she is limited in her activity        Patient went for consultation for gastric bypass. Her blood work showed elevated PTH  She is going for sleep study on 10th  Had cardiac evaluation done normal     Patient is here for follow-up on blood work  She has started changing her attitude trying to eat better stay more active  She has made appointment with the bariatric surgeon because her orthopedic doctor said she cannot put more weight on her knee  She denies any risk for sleep apnea denies any sleepiness during the day sometimes she snores when she is tired  Sometimes feels dizzy     To establish PCP  For physical  Has bad arthritis in the knee needs Motrin prescription  Has struggled to lose weight     Past medical history: Gestational diabetes, arthroscopic surgery in both knees, pulmonary embolism after  arthroscopic surgery on right knee     Social history: Non-smoker nondrinker     Review of Systems    Objective   /78   Ht 1.829 m (6')   Wt (!) 169 kg (373 lb)   BMI 50.59 kg/m²     Physical Exam  Vitals reviewed.   Constitutional:       Appearance: Normal appearance.   HENT:      Head: Normocephalic and atraumatic.      Right Ear: Tympanic membrane, ear canal and external ear normal.      Left Ear: Tympanic membrane, ear canal and external ear normal.      Nose: Nose normal.      Mouth/Throat:      Pharynx: Oropharynx is clear.   Eyes:      Extraocular Movements: Extraocular movements intact.      Conjunctiva/sclera: Conjunctivae normal.      Pupils: Pupils are equal, round, and reactive to light.   Cardiovascular:      Rate and Rhythm: Normal rate and regular rhythm.      Pulses: Normal pulses.      Heart sounds: Normal heart sounds.   Pulmonary:      Effort: Pulmonary effort is normal.      Breath sounds: Normal breath sounds.   Abdominal:      General: Abdomen is flat. Bowel sounds are normal.      Palpations: Abdomen is soft.   Musculoskeletal:      Cervical back: Normal range of motion and neck supple.   Skin:     General: Skin is warm and dry.   Neurological:      General: No focal deficit present.      Mental Status: She is alert and oriented to person, place, and time.   Psychiatric:         Mood and Affect: Mood normal.         Assessment/Plan   Problem List Items Addressed This Visit             ICD-10-CM       Cardiac and Vasculature    Hyperlipidemia E78.5    Relevant Orders    CBC    Comprehensive Metabolic Panel    Lipid Panel       Musculoskeletal and Injuries    Osteoarthritis of both knees M17.0     Other Visit Diagnoses         Codes    Encounter for counseling for sleep apnea    -  Primary Z71.89, G47.30    BMI 50.0-59.9, adult (Multi)     Z68.43    Relevant Orders    Referral to Bariatric Surgery    Referral to Endocrinology          10/23  Cholesterol is reviewed  LDL has gone up  slightly but HDL has improved quite a bit  Patient does not want to go on medication  Continue diet exercise  We will repeat blood work in 6 months  Her previous CT of the chest reviewed from 2013 which showed signs of pulmonary hypertension  We will get 2D echo  We will also do CT cardiac scoring for risk stratification  Again encouraged diet changes which will help her to lose weight     1/29  PTH elevated  We will repeat PTH and ionized calcium and phosphorus  Ultrasound parathyroid  Patient does take calcium and vitamin D supplement  Liver enzyme is elevated as well which could be from fatty liver  We will check ultrasound liver and repeat blood work  Follow-up after the test     2/26  Ultrasound parathyroid normal  PTH level normal  Ultrasound liver normal  Liver blood test normal  Patient has lost 10 pounds  I think a lot of her abnormalities were related to her diet and lifestyle     5/7     Patient has lost more weight  She is still maintaining with diet and exercise  Blood work looks improved  Cholesterol is improving  We will recheck calcium and cholesterol in 6 months     2/25/23  Blood work ordered  Advised patient not to use too much ibuprofen it will hurt kidneys and cause for ulcer  Patient probably going through menopause  Ibuprofen prescribed  Again encouraged to lose weight or consider treatment options including surgery and medication     9/23/2023  Motrin prescription given  Blood work ordered  Cholesterol  Blood work ordered for cholesterol  Discussed diet and exercise  We can try Mounjaro for weight loss if covered by insurance    1/26/2024  Blood work reviewed  Everything is looking good except cholesterol is little high  Patient is eating a lot of meat  She is cannot take of her diet  Blood pressure is stable  Refills given on Motrin  Appreciated for losing weight  Continue diet and exercise  Patient is refusing weight loss shots  Follow-up in 6 months    8/10/2024  Explained patient and site  is not for daily use it can cause pressures to be elevated in the pressures to be elevated  She really needs to lose weight to help her joints  Patient says she is eating hardly any food but she cannot exercise and that so it is hard for her to lose weight  Will refer patient to endocrinologist for checking hormones if there is any other reason for her to gain weight  Encouraged her to do physical therapy so she can learn what exercises she can do safely  Refer to sleep study as patient is having problems with snoring fatigue and bad sleep  Refer to Dr. Blood for bariatric surgery evaluation  Blood work ordered

## 2024-09-13 DIAGNOSIS — E78.5 HYPERLIPIDEMIA, UNSPECIFIED HYPERLIPIDEMIA TYPE: ICD-10-CM

## 2024-09-22 ENCOUNTER — APPOINTMENT (OUTPATIENT)
Dept: SLEEP MEDICINE | Facility: CLINIC | Age: 49
End: 2024-09-22
Payer: COMMERCIAL

## 2024-09-24 ENCOUNTER — APPOINTMENT (OUTPATIENT)
Dept: ORTHOPEDIC SURGERY | Facility: CLINIC | Age: 49
End: 2024-09-24
Payer: COMMERCIAL

## 2024-09-24 DIAGNOSIS — M17.0 PRIMARY OSTEOARTHRITIS OF BOTH KNEES: Primary | ICD-10-CM

## 2024-09-24 PROCEDURE — 1036F TOBACCO NON-USER: CPT | Performed by: FAMILY MEDICINE

## 2024-09-24 PROCEDURE — 99214 OFFICE O/P EST MOD 30 MIN: CPT | Performed by: FAMILY MEDICINE

## 2024-09-24 NOTE — PROGRESS NOTES
** Please excuse any errors in grammar or translation related to this dictation. Voice recognition software was utilized to prepare this document. **    Assessment & Plan:  Clinical presentation is most consistent with advanced bilateral knee arthritis.    Discuss with patient the nature of this disease and how it can be progressive.  Discuss how symptoms can wax and wane in severity. Non-operative treatment options reviewed below.     - Maintaining a healthy weight: Every pound of bodyweight is about 4-5 pounds through the lower extremity. Additionally to be consider for joint replacement surgery in the future, BMI needs to be <40%. Should speak with PCP on options to assist weight loss goals.  - Exercise program to improve quad & hamstring strength to support joint.   - Prescription and otc analgesics to include but not limited to APAP, ibuprofen, naproxen, diclofenac gel.  - Steroid injection. Declines today over concern of steroid raising ocular pressures. Will discuss with her ophthalmologist and PCP about being performed in future.   - Hyaluronic acid injection. Wants to proceed with prior auth.  Great response when last completed in 2021.   - Referral to pain management for potential nerve ablation.  - After failing non-operative measures, may ultimately require arthroplasty.  All questions answered and patient is agreeable to this plan.       Chief complaint:  Bilateral knee pain    HPI:  48 y/o patient, hx of bilateral meniscus surgeries in 1999 and 2000, presents with bilateral knee pain.  This complaint has been ongoing for several years.  Symptoms have progressively worsened with time.  Pain is most prominent at right medial knee and left anterior knee.  It is associated with sensation of stiffness, loss of joint motion.  She had a PE after her right meniscus surgery and was not able to do the physical therapy.  She has had persistent decreased flexion and stiffness in that knee.  Symptoms are aggravated  by walking, stairs, bending. To date, patient has tried a variety of treatments to include ibuprofen, tylenol arthritis, physical therapy, knee sleeve, cortisone injections, gel injections (last were end of  10/2021). Reports gel injection provided 6-7 months of relief. She is trying to avoid oral steroids and injection as much as possible due to raised ocular pressures. Last steroid injection completed in knees on 10/9/23.      Patient Active Problem List   Diagnosis    Dyslipidemia    Osteoarthritis of both knees    Morbid obesity (Multi)    Severe obesity (BMI >= 40) (Multi)    Bariatric surgery status    Dizziness    Elevated LFTs    Elevated parathyroid hormone    Hyperlipidemia    Obesity with body mass index 30 or greater    Other specified mental disorders due to known physiological condition    Paresthesia    Paronychia of finger of left hand    Primary osteoarthritis of both knees    Pulmonary hypertension (Multi)    Sleep disorder breathing    Tendinitis of right shoulder     Past Surgical History:   Procedure Laterality Date    KNEE SURGERY  07/25/2017    Knee Surgery    LAPAROSCOPY DIAGNOSTIC / BIOPSY / ASPIRATION / LYSIS  07/25/2017    Laparoscopy (Diagnostic)     Current Outpatient Medications on File Prior to Visit   Medication Sig Dispense Refill    ibuprofen 800 mg tablet Take 1 tablet (800 mg) by mouth 2 times a day. 60 tablet 1    tirzepatide (Mounjaro) 2.5 mg/0.5 mL pen injector Inject 2.5 mg under the skin 1 (one) time per week. 3 mL 0     No current facility-administered medications on file prior to visit.       Exam:  RIGHT Knee examined. No effusion, ecchymosis, warmth or erythema.  AROM from 0 to 95 deg with 5/5 strength. SILT overlying knee. Retropatellar crepitus present. Tenderness along medial and lateral joint lines.  No popliteal mass palpated. Negative anterior and posterior drawer.  No laxity to varus or valgus stress at 0 or 30 deg.  No patellar apprehension.     LEFT Knee  examined. No effusion, ecchymosis, warmth or erythema.  AROM from 0 to 120 deg with 5/5 strength. SILT overlying knee. Retropatellar crepitus present. Tenderness along medial and lateral joint lines.  No popliteal mass palpated. Negative anterior and posterior drawer.  No laxity to varus or valgus stress at 0 or 30 deg.  No patellar apprehension.     General Exam:  Constitutional - NAD, AAO x 3, conversing appropriately.  HEENT- Normocephalic and atraumatic. No facial deformities. Hearing grossly normal.  Lungs - Breathing non-labored with normal rate. No accessory muscle use.  CV - Extremities warm and well-perfused, brisk capillary refill present.   Neuro - CN II-XII grossly intact.    Results:  Xrays of bilateral knees obtained 10/9/23 personally interpreted as advanced degenerative changes with joint space narrowing, osteophyte formation, and subchondral sclerosis.     Lab Results   Component Value Date    HGBA1C 5.2 01/27/2024    CREATININE 1.01 08/10/2024    EGFR 68 08/10/2024

## 2024-10-02 ENCOUNTER — APPOINTMENT (OUTPATIENT)
Dept: ORTHOPEDIC SURGERY | Facility: CLINIC | Age: 49
End: 2024-10-02
Payer: COMMERCIAL

## 2024-10-11 DIAGNOSIS — M17.0 PRIMARY OSTEOARTHRITIS OF BOTH KNEES: Primary | ICD-10-CM

## 2024-10-11 NOTE — TELEPHONE ENCOUNTER
Please sign and send to [facility] for patient's bilateral knee Durolane injections.

## 2024-10-14 RX ORDER — HYALURONATE SODIUM, STABILIZED 60 MG/3 ML
60 SYRINGE (ML) INTRAARTICULAR ONCE
Qty: 6 ML | Refills: 0 | Status: SHIPPED | OUTPATIENT
Start: 2024-10-14 | End: 2024-10-14

## 2024-10-20 ENCOUNTER — APPOINTMENT (OUTPATIENT)
Dept: SLEEP MEDICINE | Facility: CLINIC | Age: 49
End: 2024-10-20
Payer: COMMERCIAL

## 2024-10-26 ENCOUNTER — APPOINTMENT (OUTPATIENT)
Dept: PRIMARY CARE | Facility: CLINIC | Age: 49
End: 2024-10-26
Payer: COMMERCIAL

## 2024-10-27 ENCOUNTER — APPOINTMENT (OUTPATIENT)
Dept: SLEEP MEDICINE | Facility: CLINIC | Age: 49
End: 2024-10-27
Payer: COMMERCIAL

## 2024-10-29 ENCOUNTER — APPOINTMENT (OUTPATIENT)
Dept: ORTHOPEDIC SURGERY | Facility: CLINIC | Age: 49
End: 2024-10-29
Payer: COMMERCIAL

## 2024-10-30 ENCOUNTER — APPOINTMENT (OUTPATIENT)
Dept: ORTHOPEDIC SURGERY | Facility: CLINIC | Age: 49
End: 2024-10-30
Payer: COMMERCIAL

## 2024-10-30 ENCOUNTER — HOSPITAL ENCOUNTER (OUTPATIENT)
Dept: RADIOLOGY | Facility: EXTERNAL LOCATION | Age: 49
Discharge: HOME | End: 2024-10-30

## 2024-10-30 DIAGNOSIS — M17.0 PRIMARY OSTEOARTHRITIS OF BOTH KNEES: ICD-10-CM

## 2024-10-30 PROCEDURE — 20611 DRAIN/INJ JOINT/BURSA W/US: CPT | Performed by: FAMILY MEDICINE

## 2024-10-30 PROCEDURE — 1036F TOBACCO NON-USER: CPT | Performed by: FAMILY MEDICINE

## 2024-10-30 RX ORDER — LIDOCAINE HYDROCHLORIDE 10 MG/ML
2 INJECTION, SOLUTION INFILTRATION; PERINEURAL
Status: COMPLETED | OUTPATIENT
Start: 2024-10-30 | End: 2024-10-30

## 2024-11-07 ENCOUNTER — APPOINTMENT (OUTPATIENT)
Dept: SLEEP MEDICINE | Facility: HOSPITAL | Age: 49
End: 2024-11-07
Payer: COMMERCIAL

## 2025-02-28 DIAGNOSIS — E78.5 HYPERLIPIDEMIA, UNSPECIFIED HYPERLIPIDEMIA TYPE: ICD-10-CM

## 2025-02-28 DIAGNOSIS — R73.9 ELEVATED BLOOD SUGAR: ICD-10-CM

## 2025-03-02 LAB
ALBUMIN SERPL-MCNC: 4.2 G/DL (ref 3.6–5.1)
ALP SERPL-CCNC: 68 U/L (ref 37–153)
ALT SERPL-CCNC: 8 U/L (ref 6–29)
ANION GAP SERPL CALCULATED.4IONS-SCNC: 6 MMOL/L (CALC) (ref 7–17)
AST SERPL-CCNC: 15 U/L (ref 10–35)
BILIRUB SERPL-MCNC: 0.5 MG/DL (ref 0.2–1.2)
BUN SERPL-MCNC: 12 MG/DL (ref 7–25)
CALCIUM SERPL-MCNC: 9.4 MG/DL (ref 8.6–10.4)
CHLORIDE SERPL-SCNC: 103 MMOL/L (ref 98–110)
CHOLEST SERPL-MCNC: 190 MG/DL
CHOLEST/HDLC SERPL: 3.3 (CALC)
CO2 SERPL-SCNC: 28 MMOL/L (ref 20–32)
CREAT SERPL-MCNC: 0.8 MG/DL (ref 0.5–1.03)
EGFRCR SERPLBLD CKD-EPI 2021: 89 ML/MIN/1.73M2
ERYTHROCYTE [DISTWIDTH] IN BLOOD BY AUTOMATED COUNT: 13.1 % (ref 11–15)
EST. AVERAGE GLUCOSE BLD GHB EST-MCNC: 108 MG/DL
EST. AVERAGE GLUCOSE BLD GHB EST-SCNC: 6 MMOL/L
GLUCOSE SERPL-MCNC: 92 MG/DL (ref 65–99)
HBA1C MFR BLD: 5.4 % OF TOTAL HGB
HCT VFR BLD AUTO: 39.4 % (ref 35–45)
HDLC SERPL-MCNC: 58 MG/DL
HGB BLD-MCNC: 12.9 G/DL (ref 11.7–15.5)
LDLC SERPL CALC-MCNC: 115 MG/DL (CALC)
MCH RBC QN AUTO: 28 PG (ref 27–33)
MCHC RBC AUTO-ENTMCNC: 32.7 G/DL (ref 32–36)
MCV RBC AUTO: 85.7 FL (ref 80–100)
NONHDLC SERPL-MCNC: 132 MG/DL (CALC)
PLATELET # BLD AUTO: 371 THOUSAND/UL (ref 140–400)
PMV BLD REES-ECKER: 10.6 FL (ref 7.5–12.5)
POTASSIUM SERPL-SCNC: 4.3 MMOL/L (ref 3.5–5.3)
PROT SERPL-MCNC: 7.3 G/DL (ref 6.1–8.1)
RBC # BLD AUTO: 4.6 MILLION/UL (ref 3.8–5.1)
SODIUM SERPL-SCNC: 137 MMOL/L (ref 135–146)
TRIGL SERPL-MCNC: 75 MG/DL
TSH SERPL-ACNC: 1.69 MIU/L
WBC # BLD AUTO: 6.8 THOUSAND/UL (ref 3.8–10.8)

## 2025-03-08 ENCOUNTER — APPOINTMENT (OUTPATIENT)
Dept: PRIMARY CARE | Facility: CLINIC | Age: 50
End: 2025-03-08
Payer: COMMERCIAL

## 2025-03-08 VITALS
BODY MASS INDEX: 39.68 KG/M2 | WEIGHT: 293 LBS | DIASTOLIC BLOOD PRESSURE: 78 MMHG | HEIGHT: 72 IN | SYSTOLIC BLOOD PRESSURE: 136 MMHG

## 2025-03-08 DIAGNOSIS — R63.4 WEIGHT LOSS: ICD-10-CM

## 2025-03-08 DIAGNOSIS — E78.5 DYSLIPIDEMIA: ICD-10-CM

## 2025-03-08 PROCEDURE — 99214 OFFICE O/P EST MOD 30 MIN: CPT | Performed by: INTERNAL MEDICINE

## 2025-03-08 PROCEDURE — 3008F BODY MASS INDEX DOCD: CPT | Performed by: INTERNAL MEDICINE

## 2025-03-08 ASSESSMENT — ENCOUNTER SYMPTOMS
DEPRESSION: 0
LOSS OF SENSATION IN FEET: 0
OCCASIONAL FEELINGS OF UNSTEADINESS: 0

## 2025-03-08 NOTE — PROGRESS NOTES
Subjective   Patient ID: Taya Logan is a 49 y.o. female who presents for Follow-up.    Med Refill    Patient is here for follow-up  She wants to have prescription for Zepbound.  She bought herself and has lost weight and is doing good for her  She did not get her sleep study done needs referral again  Follow-up on high cholesterol    Past recap  Patient is here for follow-up.  She is struggling with her right eye pressures being high up-and-down since January  Wants Motrin for joint pains  Having difficult time losing weight as she is not able to exercise at all.  She has cut down her calorie intake  Does not sleep well does not feel rested after the sleep and does not snore     patient here for follow-up  due forblood work  Follow-up on high cholesterol  Lost few pounds  Having irregular menstrual cycle  Wants prescription for ibuprofen  Working on losing weight has lost 10 pounds since January beginning        past recap  Patient here for follow-up on ultrasound of liver and ultrasound of parathyroid  Follow-up on elevated blood work for liver and parathyroid  She is changing the way she is eating and she has already lost 10 pounds in a month  She is thinking if she keeps losing this weight she does not want to go for gastric bypass  She is very motivated to make lifestyle changes  She wants refill on Motrin/omeprazole  Because of knee arthritis she is limited in her activity        Patient went for consultation for gastric bypass. Her blood work showed elevated PTH  She is going for sleep study on 10th  Had cardiac evaluation done normal     Patient is here for follow-up on blood work  She has started changing her attitude trying to eat better stay more active  She has made appointment with the bariatric surgeon because her orthopedic doctor said she cannot put more weight on her knee  She denies any risk for sleep apnea denies any sleepiness during the day sometimes she snores when she is tired  Sometimes  feels dizzy     To establish PCP  For physical  Has bad arthritis in the knee needs Motrin prescription  Has struggled to lose weight     Past medical history: Gestational diabetes, arthroscopic surgery in both knees, pulmonary embolism after arthroscopic surgery on right knee     Social history: Non-smoker nondrinker     Review of Systems    Objective   /78   Ht 1.829 m (6')   Wt (!) 163 kg (359 lb)   BMI 48.69 kg/m²     Physical Exam  Vitals reviewed.   Constitutional:       Appearance: Normal appearance.   HENT:      Head: Normocephalic and atraumatic.      Right Ear: Tympanic membrane, ear canal and external ear normal.      Left Ear: Tympanic membrane, ear canal and external ear normal.      Nose: Nose normal.      Mouth/Throat:      Pharynx: Oropharynx is clear.   Eyes:      Extraocular Movements: Extraocular movements intact.      Conjunctiva/sclera: Conjunctivae normal.      Pupils: Pupils are equal, round, and reactive to light.   Cardiovascular:      Rate and Rhythm: Normal rate and regular rhythm.      Pulses: Normal pulses.      Heart sounds: Normal heart sounds.   Pulmonary:      Effort: Pulmonary effort is normal.      Breath sounds: Normal breath sounds.   Abdominal:      General: Abdomen is flat. Bowel sounds are normal.      Palpations: Abdomen is soft.   Musculoskeletal:      Cervical back: Normal range of motion and neck supple.   Skin:     General: Skin is warm and dry.   Neurological:      General: No focal deficit present.      Mental Status: She is alert and oriented to person, place, and time.   Psychiatric:         Mood and Affect: Mood normal.         Assessment/Plan   Problem List Items Addressed This Visit             ICD-10-CM       Cardiac and Vasculature    Dyslipidemia E78.5    Relevant Orders    CBC    Comprehensive Metabolic Panel    Lipid Panel     Other Visit Diagnoses         Codes    Weight loss     R63.4    Relevant Medications    tirzepatide, weight loss, (Zepbound) 5  mg/0.5 mL injection            10/23  Cholesterol is reviewed  LDL has gone up slightly but HDL has improved quite a bit  Patient does not want to go on medication  Continue diet exercise  We will repeat blood work in 6 months  Her previous CT of the chest reviewed from 2013 which showed signs of pulmonary hypertension  We will get 2D echo  We will also do CT cardiac scoring for risk stratification  Again encouraged diet changes which will help her to lose weight     1/29  PTH elevated  We will repeat PTH and ionized calcium and phosphorus  Ultrasound parathyroid  Patient does take calcium and vitamin D supplement  Liver enzyme is elevated as well which could be from fatty liver  We will check ultrasound liver and repeat blood work  Follow-up after the test     2/26  Ultrasound parathyroid normal  PTH level normal  Ultrasound liver normal  Liver blood test normal  Patient has lost 10 pounds  I think a lot of her abnormalities were related to her diet and lifestyle     5/7     Patient has lost more weight  She is still maintaining with diet and exercise  Blood work looks improved  Cholesterol is improving  We will recheck calcium and cholesterol in 6 months     2/25/23  Blood work ordered  Advised patient not to use too much ibuprofen it will hurt kidneys and cause for ulcer  Patient probably going through menopause  Ibuprofen prescribed  Again encouraged to lose weight or consider treatment options including surgery and medication     9/23/2023  Motrin prescription given  Blood work ordered  Cholesterol  Blood work ordered for cholesterol  Discussed diet and exercise  We can try Mounjaro for weight loss if covered by insurance    1/26/2024  Blood work reviewed  Everything is looking good except cholesterol is little high  Patient is eating a lot of meat  She is cannot take of her diet  Blood pressure is stable  Refills given on Motrin  Appreciated for losing weight  Continue diet and exercise  Patient is refusing  weight loss shots  Follow-up in 6 months    8/10/2024  Explained patient and site is not for daily use it can cause pressures to be elevated in the pressures to be elevated  She really needs to lose weight to help her joints  Patient says she is eating hardly any food but she cannot exercise and that so it is hard for her to lose weight  Will refer patient to endocrinologist for checking hormones if there is any other reason for her to gain weight  Encouraged her to do physical therapy so she can learn what exercises she can do safely  Refer to sleep study as patient is having problems with snoring fatigue and bad sleep  Refer to Dr. Blood for bariatric surgery evaluation  Blood work ordered    3/8/2025  Patient does not want to do bariatric surgery  Blood work shows improvement  Blood sugar coming down  Will repeat blood work in 6 months  Sleep study ordered  Will prescribe Zepbound as patient is doing well on it and losing weight

## 2025-03-15 ENCOUNTER — HOSPITAL ENCOUNTER (OUTPATIENT)
Dept: RADIOLOGY | Facility: CLINIC | Age: 50
Discharge: HOME | End: 2025-03-15
Payer: COMMERCIAL

## 2025-03-15 DIAGNOSIS — Z12.31 ENCOUNTER FOR SCREENING MAMMOGRAM FOR MALIGNANT NEOPLASM OF BREAST: ICD-10-CM

## 2025-03-15 PROCEDURE — 77067 SCR MAMMO BI INCL CAD: CPT | Performed by: STUDENT IN AN ORGANIZED HEALTH CARE EDUCATION/TRAINING PROGRAM

## 2025-03-15 PROCEDURE — 77063 BREAST TOMOSYNTHESIS BI: CPT | Performed by: STUDENT IN AN ORGANIZED HEALTH CARE EDUCATION/TRAINING PROGRAM

## 2025-03-15 PROCEDURE — 77063 BREAST TOMOSYNTHESIS BI: CPT
